# Patient Record
Sex: FEMALE | Race: WHITE | NOT HISPANIC OR LATINO | Employment: UNEMPLOYED | URBAN - METROPOLITAN AREA
[De-identification: names, ages, dates, MRNs, and addresses within clinical notes are randomized per-mention and may not be internally consistent; named-entity substitution may affect disease eponyms.]

---

## 2019-05-30 ENCOUNTER — HOSPITAL ENCOUNTER (OUTPATIENT)
Facility: HOSPITAL | Age: 49
Setting detail: OBSERVATION
Discharge: HOME | End: 2019-06-01
Attending: HOSPITALIST | Admitting: HOSPITALIST

## 2019-05-30 PROCEDURE — 96360 HYDRATION IV INFUSION INIT: CPT

## 2019-05-30 PROCEDURE — G0378 HOSPITAL OBSERVATION PER HR: HCPCS

## 2019-05-30 RX ORDER — RIZATRIPTAN BENZOATE 5 MG/1
5 TABLET, ORALLY DISINTEGRATING ORAL ONCE AS NEEDED
COMMUNITY

## 2019-05-30 RX ORDER — PANTOPRAZOLE SODIUM 40 MG/1
40 TABLET, DELAYED RELEASE ORAL
Status: DISCONTINUED | OUTPATIENT
Start: 2019-05-31 | End: 2019-05-31

## 2019-05-30 RX ORDER — OMEPRAZOLE 20 MG/1
20 CAPSULE, DELAYED RELEASE ORAL 2 TIMES DAILY
COMMUNITY

## 2019-05-30 RX ORDER — OXYCODONE AND ACETAMINOPHEN 7.5; 325 MG/1; MG/1
1 TABLET ORAL EVERY 6 HOURS SCHEDULED
COMMUNITY

## 2019-05-30 RX ORDER — SODIUM CHLORIDE 9 MG/ML
75 INJECTION, SOLUTION INTRAVENOUS CONTINUOUS
Status: DISCONTINUED | OUTPATIENT
Start: 2019-05-30 | End: 2019-06-01

## 2019-05-30 RX ORDER — ACYCLOVIR 400 MG/1
400 TABLET ORAL 2 TIMES DAILY
COMMUNITY

## 2019-05-30 RX ORDER — CEFTRIAXONE SODIUM 1 G/50ML
1 INJECTION, SOLUTION INTRAVENOUS EVERY 24 HOURS
Status: DISCONTINUED | OUTPATIENT
Start: 2019-05-30 | End: 2019-06-01

## 2019-05-30 RX ORDER — RANITIDINE HCL 75 MG
75 TABLET ORAL DAILY
COMMUNITY
End: 2019-06-01 | Stop reason: HOSPADM

## 2019-05-30 RX ORDER — PREGABALIN 50 MG/1
50 CAPSULE ORAL 3 TIMES DAILY
COMMUNITY

## 2019-05-30 RX ORDER — TOPIRAMATE 100 MG/1
200 TABLET, FILM COATED ORAL 2 TIMES DAILY
COMMUNITY

## 2019-05-30 RX ADMIN — SODIUM CHLORIDE 125 ML/HR: 9 INJECTION, SOLUTION INTRAVENOUS at 22:43

## 2019-05-31 ENCOUNTER — APPOINTMENT (OUTPATIENT)
Dept: ULTRASOUND IMAGING | Facility: HOSPITAL | Age: 49
End: 2019-05-31

## 2019-05-31 PROBLEM — R11.2 NAUSEA & VOMITING: Status: ACTIVE | Noted: 2019-05-31

## 2019-05-31 LAB
ALBUMIN SERPL-MCNC: 4.2 G/DL (ref 3.5–5.2)
ALBUMIN/GLOB SERPL: 1.7 G/DL
ALP SERPL-CCNC: 57 U/L (ref 39–117)
ALT SERPL W P-5'-P-CCNC: 7 U/L (ref 1–33)
ANION GAP SERPL CALCULATED.3IONS-SCNC: 11 MMOL/L
AST SERPL-CCNC: 12 U/L (ref 1–32)
BACTERIA UR QL AUTO: ABNORMAL /HPF
BASOPHILS # BLD AUTO: 0.01 10*3/MM3 (ref 0–0.2)
BASOPHILS NFR BLD AUTO: 0.1 % (ref 0–1.5)
BILIRUB SERPL-MCNC: 0.3 MG/DL (ref 0.2–1.2)
BILIRUB UR QL STRIP: NEGATIVE
BUN BLD-MCNC: 18 MG/DL (ref 6–20)
BUN/CREAT SERPL: 14.8 (ref 7–25)
CALCIUM SPEC-SCNC: 8.3 MG/DL (ref 8.6–10.5)
CHLORIDE SERPL-SCNC: 110 MMOL/L (ref 98–107)
CHLORIDE UR-SCNC: 40 MMOL/L
CLARITY UR: CLEAR
CO2 SERPL-SCNC: 16 MMOL/L (ref 22–29)
COLOR UR: YELLOW
CREAT BLD-MCNC: 1.22 MG/DL (ref 0.57–1)
CREAT UR-MCNC: 70.8 MG/DL
DEPRECATED RDW RBC AUTO: 50 FL (ref 37–54)
EOSINOPHIL # BLD AUTO: 0.15 10*3/MM3 (ref 0–0.4)
EOSINOPHIL NFR BLD AUTO: 1.7 % (ref 0.3–6.2)
ERYTHROCYTE [DISTWIDTH] IN BLOOD BY AUTOMATED COUNT: 12.9 % (ref 12.3–15.4)
GFR SERPL CREATININE-BSD FRML MDRD: 47 ML/MIN/1.73
GLOBULIN UR ELPH-MCNC: 2.5 GM/DL
GLUCOSE BLD-MCNC: 101 MG/DL (ref 65–99)
GLUCOSE UR STRIP-MCNC: NEGATIVE MG/DL
HCT VFR BLD AUTO: 42.6 % (ref 34–46.6)
HGB BLD-MCNC: 13.6 G/DL (ref 12–15.9)
HGB UR QL STRIP.AUTO: ABNORMAL
HYALINE CASTS UR QL AUTO: ABNORMAL /LPF
IMM GRANULOCYTES # BLD AUTO: 0.03 10*3/MM3 (ref 0–0.05)
IMM GRANULOCYTES NFR BLD AUTO: 0.3 % (ref 0–0.5)
KETONES UR QL STRIP: NEGATIVE
LEUKOCYTE ESTERASE UR QL STRIP.AUTO: ABNORMAL
LYMPHOCYTES # BLD AUTO: 1.52 10*3/MM3 (ref 0.7–3.1)
LYMPHOCYTES NFR BLD AUTO: 17.6 % (ref 19.6–45.3)
MAGNESIUM SERPL-MCNC: 1.7 MG/DL (ref 1.6–2.6)
MCH RBC QN AUTO: 33.4 PG (ref 26.6–33)
MCHC RBC AUTO-ENTMCNC: 31.9 G/DL (ref 31.5–35.7)
MCV RBC AUTO: 104.7 FL (ref 79–97)
MONOCYTES # BLD AUTO: 0.61 10*3/MM3 (ref 0.1–0.9)
MONOCYTES NFR BLD AUTO: 7.1 % (ref 5–12)
NEUTROPHILS # BLD AUTO: 6.33 10*3/MM3 (ref 1.7–7)
NEUTROPHILS NFR BLD AUTO: 73.2 % (ref 42.7–76)
NITRITE UR QL STRIP: NEGATIVE
NRBC BLD AUTO-RTO: 0 /100 WBC (ref 0–0.2)
PH UR STRIP.AUTO: 5.5 [PH] (ref 5–8)
PHOSPHATE SERPL-MCNC: 2.5 MG/DL (ref 2.5–4.5)
PLATELET # BLD AUTO: 218 10*3/MM3 (ref 140–450)
PMV BLD AUTO: 10.7 FL (ref 6–12)
POTASSIUM BLD-SCNC: 4.2 MMOL/L (ref 3.5–5.2)
PROT SERPL-MCNC: 6.7 G/DL (ref 6–8.5)
PROT UR QL STRIP: NEGATIVE
RBC # BLD AUTO: 4.07 10*6/MM3 (ref 3.77–5.28)
RBC # UR: ABNORMAL /HPF
REF LAB TEST METHOD: ABNORMAL
SODIUM BLD-SCNC: 137 MMOL/L (ref 136–145)
SODIUM UR-SCNC: <20 MMOL/L
SP GR UR STRIP: 1.01 (ref 1–1.03)
SQUAMOUS #/AREA URNS HPF: ABNORMAL /HPF
UROBILINOGEN UR QL STRIP: ABNORMAL
WBC NRBC COR # BLD: 8.65 10*3/MM3 (ref 3.4–10.8)
WBC UR QL AUTO: ABNORMAL /HPF

## 2019-05-31 PROCEDURE — 25010000002 CEFTRIAXONE PER 250 MG: Performed by: HOSPITALIST

## 2019-05-31 PROCEDURE — 96361 HYDRATE IV INFUSION ADD-ON: CPT

## 2019-05-31 PROCEDURE — 76775 US EXAM ABDO BACK WALL LIM: CPT

## 2019-05-31 PROCEDURE — 82570 ASSAY OF URINE CREATININE: CPT | Performed by: INTERNAL MEDICINE

## 2019-05-31 PROCEDURE — 83735 ASSAY OF MAGNESIUM: CPT | Performed by: INTERNAL MEDICINE

## 2019-05-31 PROCEDURE — 85025 COMPLETE CBC W/AUTO DIFF WBC: CPT | Performed by: INTERNAL MEDICINE

## 2019-05-31 PROCEDURE — G0378 HOSPITAL OBSERVATION PER HR: HCPCS

## 2019-05-31 PROCEDURE — 80053 COMPREHEN METABOLIC PANEL: CPT | Performed by: INTERNAL MEDICINE

## 2019-05-31 PROCEDURE — 81001 URINALYSIS AUTO W/SCOPE: CPT | Performed by: INTERNAL MEDICINE

## 2019-05-31 PROCEDURE — 84300 ASSAY OF URINE SODIUM: CPT | Performed by: INTERNAL MEDICINE

## 2019-05-31 PROCEDURE — 82436 ASSAY OF URINE CHLORIDE: CPT | Performed by: INTERNAL MEDICINE

## 2019-05-31 PROCEDURE — 84100 ASSAY OF PHOSPHORUS: CPT | Performed by: INTERNAL MEDICINE

## 2019-05-31 RX ORDER — PANTOPRAZOLE SODIUM 40 MG/1
40 TABLET, DELAYED RELEASE ORAL ONCE
Status: DISCONTINUED | OUTPATIENT
Start: 2019-05-31 | End: 2019-05-31

## 2019-05-31 RX ORDER — ACYCLOVIR 400 MG/1
400 TABLET ORAL 2 TIMES DAILY
Status: DISCONTINUED | OUTPATIENT
Start: 2019-05-31 | End: 2019-06-01 | Stop reason: HOSPADM

## 2019-05-31 RX ORDER — OXYCODONE AND ACETAMINOPHEN 7.5; 325 MG/1; MG/1
1 TABLET ORAL EVERY 6 HOURS SCHEDULED
Status: DISCONTINUED | OUTPATIENT
Start: 2019-05-31 | End: 2019-05-31

## 2019-05-31 RX ORDER — OXYCODONE AND ACETAMINOPHEN 7.5; 325 MG/1; MG/1
1 TABLET ORAL EVERY 4 HOURS PRN
Status: DISCONTINUED | OUTPATIENT
Start: 2019-05-31 | End: 2019-06-01

## 2019-05-31 RX ORDER — SUCRALFATE ORAL 1 G/10ML
1 SUSPENSION ORAL
Status: DISCONTINUED | OUTPATIENT
Start: 2019-05-31 | End: 2019-06-01 | Stop reason: HOSPADM

## 2019-05-31 RX ORDER — PREGABALIN 50 MG/1
50 CAPSULE ORAL 3 TIMES DAILY
Status: DISCONTINUED | OUTPATIENT
Start: 2019-05-31 | End: 2019-06-01 | Stop reason: HOSPADM

## 2019-05-31 RX ORDER — PANTOPRAZOLE SODIUM 40 MG/1
40 TABLET, DELAYED RELEASE ORAL ONCE
Status: COMPLETED | OUTPATIENT
Start: 2019-05-31 | End: 2019-05-31

## 2019-05-31 RX ORDER — TOPIRAMATE 100 MG/1
200 TABLET, FILM COATED ORAL DAILY
Status: DISCONTINUED | OUTPATIENT
Start: 2019-05-31 | End: 2019-06-01 | Stop reason: HOSPADM

## 2019-05-31 RX ORDER — PANTOPRAZOLE SODIUM 40 MG/1
40 TABLET, DELAYED RELEASE ORAL EVERY MORNING
Status: DISCONTINUED | OUTPATIENT
Start: 2019-06-01 | End: 2019-05-31

## 2019-05-31 RX ORDER — PANTOPRAZOLE SODIUM 40 MG/1
40 TABLET, DELAYED RELEASE ORAL
Status: DISCONTINUED | OUTPATIENT
Start: 2019-05-31 | End: 2019-06-01 | Stop reason: HOSPADM

## 2019-05-31 RX ADMIN — ACYCLOVIR 400 MG: 400 TABLET ORAL at 20:40

## 2019-05-31 RX ADMIN — PREGABALIN 50 MG: 50 CAPSULE ORAL at 20:39

## 2019-05-31 RX ADMIN — TOPIRAMATE 200 MG: 100 TABLET, FILM COATED ORAL at 15:22

## 2019-05-31 RX ADMIN — SUCRALFATE 1 G: 1 SUSPENSION ORAL at 20:39

## 2019-05-31 RX ADMIN — OXYCODONE HYDROCHLORIDE AND ACETAMINOPHEN 1 TABLET: 7.5; 325 TABLET ORAL at 20:39

## 2019-05-31 RX ADMIN — SUCRALFATE 1 G: 1 SUSPENSION ORAL at 16:39

## 2019-05-31 RX ADMIN — PANTOPRAZOLE SODIUM 40 MG: 40 TABLET, DELAYED RELEASE ORAL at 01:36

## 2019-05-31 RX ADMIN — PANTOPRAZOLE SODIUM 40 MG: 40 TABLET, DELAYED RELEASE ORAL at 16:39

## 2019-05-31 RX ADMIN — SODIUM CHLORIDE 75 ML/HR: 9 INJECTION, SOLUTION INTRAVENOUS at 15:22

## 2019-05-31 RX ADMIN — PREGABALIN 50 MG: 50 CAPSULE ORAL at 15:22

## 2019-05-31 RX ADMIN — CEFTRIAXONE SODIUM 1 G: 1 INJECTION, SOLUTION INTRAVENOUS at 18:18

## 2019-06-01 VITALS
HEART RATE: 68 BPM | SYSTOLIC BLOOD PRESSURE: 106 MMHG | WEIGHT: 117.4 LBS | TEMPERATURE: 98 F | BODY MASS INDEX: 22.18 KG/M2 | DIASTOLIC BLOOD PRESSURE: 63 MMHG | RESPIRATION RATE: 20 BRPM

## 2019-06-01 LAB
ALBUMIN SERPL-MCNC: 3.2 G/DL (ref 3.5–5.2)
ALBUMIN/GLOB SERPL: 1.4 G/DL
ALP SERPL-CCNC: 48 U/L (ref 39–117)
ALT SERPL W P-5'-P-CCNC: 6 U/L (ref 1–33)
ANION GAP SERPL CALCULATED.3IONS-SCNC: 8.5 MMOL/L
AST SERPL-CCNC: 11 U/L (ref 1–32)
BASOPHILS # BLD AUTO: 0.02 10*3/MM3 (ref 0–0.2)
BASOPHILS NFR BLD AUTO: 0.4 % (ref 0–1.5)
BILIRUB SERPL-MCNC: 0.2 MG/DL (ref 0.2–1.2)
BUN BLD-MCNC: 12 MG/DL (ref 6–20)
BUN/CREAT SERPL: 13.6 (ref 7–25)
CALCIUM SPEC-SCNC: 8.2 MG/DL (ref 8.6–10.5)
CHLORIDE SERPL-SCNC: 113 MMOL/L (ref 98–107)
CHOLEST SERPL-MCNC: 108 MG/DL (ref 0–200)
CO2 SERPL-SCNC: 18.5 MMOL/L (ref 22–29)
CREAT BLD-MCNC: 0.88 MG/DL (ref 0.57–1)
DEPRECATED RDW RBC AUTO: 50.1 FL (ref 37–54)
EOSINOPHIL # BLD AUTO: 0.19 10*3/MM3 (ref 0–0.4)
EOSINOPHIL NFR BLD AUTO: 3.5 % (ref 0.3–6.2)
ERYTHROCYTE [DISTWIDTH] IN BLOOD BY AUTOMATED COUNT: 13 % (ref 12.3–15.4)
GFR SERPL CREATININE-BSD FRML MDRD: 68 ML/MIN/1.73
GLOBULIN UR ELPH-MCNC: 2.3 GM/DL
GLUCOSE BLD-MCNC: 87 MG/DL (ref 65–99)
HBA1C MFR BLD: 5.04 % (ref 4.8–5.6)
HCT VFR BLD AUTO: 32 % (ref 34–46.6)
HDLC SERPL-MCNC: 40 MG/DL (ref 40–60)
HGB BLD-MCNC: 10.4 G/DL (ref 12–15.9)
LDLC SERPL CALC-MCNC: 55 MG/DL (ref 0–100)
LDLC/HDLC SERPL: 1.37 {RATIO}
LYMPHOCYTES # BLD AUTO: 2.5 10*3/MM3 (ref 0.7–3.1)
LYMPHOCYTES NFR BLD AUTO: 46.5 % (ref 19.6–45.3)
MCH RBC QN AUTO: 34.2 PG (ref 26.6–33)
MCHC RBC AUTO-ENTMCNC: 32.5 G/DL (ref 31.5–35.7)
MCV RBC AUTO: 105.3 FL (ref 79–97)
MONOCYTES # BLD AUTO: 0.47 10*3/MM3 (ref 0.1–0.9)
MONOCYTES NFR BLD AUTO: 8.7 % (ref 5–12)
NEUTROPHILS # BLD AUTO: 2.18 10*3/MM3 (ref 1.7–7)
NEUTROPHILS NFR BLD AUTO: 40.5 % (ref 42.7–76)
NT-PROBNP SERPL-MCNC: 36 PG/ML (ref 5–900)
PLATELET # BLD AUTO: 179 10*3/MM3 (ref 140–450)
PMV BLD AUTO: 11.3 FL (ref 6–12)
POTASSIUM BLD-SCNC: 4.1 MMOL/L (ref 3.5–5.2)
PROT SERPL-MCNC: 5.5 G/DL (ref 6–8.5)
RBC # BLD AUTO: 3.04 10*6/MM3 (ref 3.77–5.28)
SODIUM BLD-SCNC: 140 MMOL/L (ref 136–145)
TRIGL SERPL-MCNC: 66 MG/DL (ref 0–150)
TSH SERPL DL<=0.05 MIU/L-ACNC: 0.8 MIU/ML (ref 0.27–4.2)
VLDLC SERPL-MCNC: 13.2 MG/DL (ref 5–40)
WBC NRBC COR # BLD: 5.38 10*3/MM3 (ref 3.4–10.8)

## 2019-06-01 PROCEDURE — 84443 ASSAY THYROID STIM HORMONE: CPT | Performed by: HOSPITALIST

## 2019-06-01 PROCEDURE — G0378 HOSPITAL OBSERVATION PER HR: HCPCS

## 2019-06-01 PROCEDURE — 80053 COMPREHEN METABOLIC PANEL: CPT | Performed by: HOSPITALIST

## 2019-06-01 PROCEDURE — 85025 COMPLETE CBC W/AUTO DIFF WBC: CPT | Performed by: HOSPITALIST

## 2019-06-01 PROCEDURE — 80061 LIPID PANEL: CPT | Performed by: HOSPITALIST

## 2019-06-01 PROCEDURE — 83036 HEMOGLOBIN GLYCOSYLATED A1C: CPT | Performed by: HOSPITALIST

## 2019-06-01 PROCEDURE — 83880 ASSAY OF NATRIURETIC PEPTIDE: CPT | Performed by: HOSPITALIST

## 2019-06-01 RX ORDER — SODIUM BICARBONATE 650 MG/1
650 TABLET ORAL 3 TIMES DAILY
Status: DISCONTINUED | OUTPATIENT
Start: 2019-06-01 | End: 2019-06-01 | Stop reason: HOSPADM

## 2019-06-01 RX ORDER — SODIUM BICARBONATE 650 MG/1
650 TABLET ORAL 3 TIMES DAILY
Qty: 90 TABLET | Refills: 0 | Status: SHIPPED | OUTPATIENT
Start: 2019-06-01 | End: 2019-06-01 | Stop reason: HOSPADM

## 2019-06-01 RX ORDER — CEFUROXIME AXETIL 250 MG/1
250 TABLET ORAL EVERY 12 HOURS SCHEDULED
Status: DISCONTINUED | OUTPATIENT
Start: 2019-06-01 | End: 2019-06-01 | Stop reason: HOSPADM

## 2019-06-01 RX ORDER — CEFUROXIME AXETIL 250 MG/1
250 TABLET ORAL EVERY 12 HOURS SCHEDULED
Qty: 10 TABLET | Refills: 0 | Status: SHIPPED | OUTPATIENT
Start: 2019-06-01 | End: 2019-06-06

## 2019-06-01 RX ORDER — SUCRALFATE ORAL 1 G/10ML
1 SUSPENSION ORAL
Qty: 1200 ML | Refills: 0 | Status: SHIPPED | OUTPATIENT
Start: 2019-06-01 | End: 2019-07-01

## 2019-06-01 RX ADMIN — TOPIRAMATE 200 MG: 100 TABLET, FILM COATED ORAL at 09:19

## 2019-06-01 RX ADMIN — PANTOPRAZOLE SODIUM 40 MG: 40 TABLET, DELAYED RELEASE ORAL at 09:18

## 2019-06-01 RX ADMIN — OXYCODONE HYDROCHLORIDE AND ACETAMINOPHEN 1 TABLET: 7.5; 325 TABLET ORAL at 00:35

## 2019-06-01 RX ADMIN — OXYCODONE HYDROCHLORIDE AND ACETAMINOPHEN 1 TABLET: 7.5; 325 TABLET ORAL at 05:58

## 2019-06-01 RX ADMIN — SUCRALFATE 1 G: 1 SUSPENSION ORAL at 09:18

## 2019-06-01 RX ADMIN — ACYCLOVIR 400 MG: 400 TABLET ORAL at 09:18

## 2019-06-01 RX ADMIN — PREGABALIN 50 MG: 50 CAPSULE ORAL at 09:18

## 2019-06-01 NOTE — PROGRESS NOTES
Case Management Discharge Note    Final Note: pt dc'd to home today.    Destination      No service has been selected for the patient.      Durable Medical Equipment      No service has been selected for the patient.      Dialysis/Infusion      No service has been selected for the patient.      Home Medical Care      No service has been selected for the patient.      Therapy      No service has been selected for the patient.      Community Resources      No service has been selected for the patient.        Transportation Services  Private: Car    Final Discharge Disposition Code: 01 - home or self-care

## 2019-06-01 NOTE — PLAN OF CARE
Problem: Patient Care Overview  Goal: Individualization and Mutuality  Outcome: Ongoing (interventions implemented as appropriate)    Goal: Discharge Needs Assessment  Outcome: Ongoing (interventions implemented as appropriate)      Problem: Pain, Chronic (Adult)  Goal: Acceptable Pain/Comfort Level and Functional Ability  Outcome: Ongoing (interventions implemented as appropriate)      Problem: Renal Failure/Kidney Injury, Acute (Adult)  Goal: Signs and Symptoms of Listed Potential Problems Will be Absent, Minimized or Managed (Renal Failure/Kidney Injury, Acute)  Outcome: Ongoing (interventions implemented as appropriate)

## 2019-06-01 NOTE — PROGRESS NOTES
Daily progress note    Chief complaint  Doing much better  No new complaints  Wants to go home    History of present illness  49-year-old white female with history of hypertension gastroesophageal for disease and chronic low back pain presented to [facility] with abdominal pain diarrhea fatigue tired and generalized weakness for last 3 days.  Patient evaluated in ER found to have acute UTI with acute kidney injury admit for management.  Patient denies any burning in the urination.  Patient denies any fever chills.  Patient also denies any chest pain shortness of breath but she has no energy.  Patient admitted for management.    Review of Systems  Pertinent items are noted in HPI    Physical exam  Blood pressure 106/63, pulse 68, temperature 98 °F (36.7 °C), temperature source Oral, resp. rate 20, weight 53.3 kg (117 lb 6.4 oz).    HEENT unremarkable  Neck supple  Lungs decreased breath sounds otherwise clear no wheezes rhonchi crackles  Heart S1-S2 no murmur gallop rub  Abdomen soft mild tenderness hyperactive bowel sounds no rebound tenderness no masses palpable  Extremities no edema  Neuro no focal deficit  Psych normal mood and behavior    LABS  Lab Results (last 24 hours)     Procedure Component Value Units Date/Time    BNP [[id number]]  (Normal) Collected:  06/01/19 0321    Specimen:  Blood Updated:  06/01/19 0501     proBNP 36.0 pg/mL     Narrative:       Among patients with dyspnea, NT-proBNP is highly sensitive for the detection of acute congestive heart failure. In addition NT-proBNP of <300 pg/ml effectively rules out acute congestive heart failure with 99% negative predictive value.    TSH [[id number]]  (Normal) Collected:  06/01/19 0321    Specimen:  Blood Updated:  06/01/19 0501     TSH 0.801 mIU/mL     Comprehensive Metabolic Panel [[id number]]  (Abnormal) Collected:  06/01/19 0321    Specimen:  Blood Updated:  06/01/19 0448     Glucose 87 mg/dL      BUN 12 mg/dL      Creatinine 0.88 mg/dL       Sodium 140 mmol/L      Potassium 4.1 mmol/L      Chloride 113 mmol/L      CO2 18.5 mmol/L      Calcium 8.2 mg/dL      Total Protein 5.5 g/dL      Albumin 3.20 g/dL      ALT (SGPT) 6 U/L      AST (SGOT) 11 U/L      Alkaline Phosphatase 48 U/L      Total Bilirubin 0.2 mg/dL      eGFR Non African Amer 68 mL/min/1.73      Globulin 2.3 gm/dL      A/G Ratio 1.4 g/dL      BUN/Creatinine Ratio 13.6     Anion Gap 8.5 mmol/L     Narrative:       GFR Normal >60  Chronic Kidney Disease <60  Kidney Failure <15    Lipid Panel [[id number]] Collected:  06/01/19 0321    Specimen:  Blood Updated:  06/01/19 0448     Total Cholesterol 108 mg/dL      Triglycerides 66 mg/dL      HDL Cholesterol 40 mg/dL      LDL Cholesterol  55 mg/dL      VLDL Cholesterol 13.2 mg/dL      LDL/HDL Ratio 1.37    Narrative:       Cholesterol Reference Ranges  (U.S. Department of Health and Human Services ATP III Classifications)    Desirable          <200 mg/dL  Borderline High    200-239 mg/dL  High Risk          >240 mg/dL      Triglyceride Reference Ranges  (U.S. Department of Health and Human Services ATP III Classifications)    Normal           <150 mg/dL  Borderline High  150-199 mg/dL  High             200-499 mg/dL  Very High        >500 mg/dL    HDL Reference Ranges  (U.S. Department of Health and Human Services ATP III Classifcations)    Low     <40 mg/dl (major risk factor for CHD)  High    >60 mg/dl ('negative' risk factor for CHD)        LDL Reference Ranges  (U.S. Department of Health and Human Services ATP III Classifcations)    Optimal          <100 mg/dL  Near Optimal     100-129 mg/dL  Borderline High  130-159 mg/dL  High             160-189 mg/dL  Very High        >189 mg/dL    CBC & Differential [[id number]] Collected:  06/01/19 0321    Specimen:  Blood Updated:  06/01/19 0432    Narrative:       The following orders were created for panel order CBC & Differential.  Procedure                               Abnormality         Status                      ---------                               -----------         ------                     CBC Auto Differential[[id number]]        Abnormal            Final result                 Please view results for these tests on the individual orders.    CBC Auto Differential [[id number]]  (Abnormal) Collected:  06/01/19 0321    Specimen:  Blood Updated:  06/01/19 0432     WBC 5.38 10*3/mm3      RBC 3.04 10*6/mm3      Hemoglobin 10.4 g/dL      Hematocrit 32.0 %      .3 fL      MCH 34.2 pg      MCHC 32.5 g/dL      RDW 13.0 %      RDW-SD 50.1 fl      MPV 11.3 fL      Platelets 179 10*3/mm3      Neutrophil % 40.5 %      Lymphocyte % 46.5 %      Monocyte % 8.7 %      Eosinophil % 3.5 %      Basophil % 0.4 %      Neutrophils, Absolute 2.18 10*3/mm3      Lymphocytes, Absolute 2.50 10*3/mm3      Monocytes, Absolute 0.47 10*3/mm3      Eosinophils, Absolute 0.19 10*3/mm3      Basophils, Absolute 0.02 10*3/mm3     Hemoglobin A1c [[id number]]  (Normal) Collected:  06/01/19 0321    Specimen:  Blood Updated:  06/01/19 0430     Hemoglobin A1C 5.04 %     Narrative:       Hemoglobin A1C Ranges:    Increased Risk for Diabetes  5.7% to 6.4%  Diabetes                     >= 6.5%  Diabetic Goal                < 7.0%        Imaging Results (last 24 hours)     Procedure Component Value Units Date/Time    US Renal Bilateral [[id number]] Collected:  05/31/19 1329     Updated:  05/31/19 1432    Narrative:       US RENAL BILATERAL-     CLINICAL: Acute renal insufficiency.     COMPARISON: CT scan of the abdomen 07/15/2010.     FINDINGS: The right kidney is 10.3 cm in length, the left kidney is 10  cm in length.     No obstructive uropathy. The renal cortical echotexture is normal. There  is a 3 mm capsular or subcapsular punctate calcification along the upper  pole of the right kidney. No right or left-sided renal calculi or mass.     CONCLUSION: The kidneys are normal with the exception of a tiny  capsular/subcapsular calcification along  the upper pole of the right  kidney.     This report was finalized on 5/31/2019 2:29 PM by Dr. Byron Burnett M.D.             Current Facility-Administered Medications:   •  acyclovir (ZOVIRAX) tablet 400 mg, 400 mg, Oral, BID, Main Muller MD, 400 mg at 06/01/19 0918  •  cefTRIAXone (ROCEPHIN) IVPB 1 g, 1 g, Intravenous, Q24H, Main Muller MD, Last Rate: 100 mL/hr at 05/31/19 1818, 1 g at 05/31/19 1818  •  oxyCODONE-acetaminophen (PERCOCET) 7.5-325 MG per tablet 1 tablet, 1 tablet, Oral, Q4H PRN, Main Muller MD, 1 tablet at 06/01/19 0558  •  pantoprazole (PROTONIX) EC tablet 40 mg, 40 mg, Oral, BID AC, Main Muller MD, 40 mg at 06/01/19 0918  •  pregabalin (LYRICA) capsule 50 mg, 50 mg, Oral, TID, Main Muller MD, 50 mg at 06/01/19 0918  •  sucralfate (CARAFATE) 1 GM/10ML suspension 1 g, 1 g, Oral, 4x Daily AC & at Bedtime, Main Muller MD, 1 g at 06/01/19 0918  •  topiramate (TOPAMAX) tablet 200 mg, 200 mg, Oral, Daily, Main Muller MD, 200 mg at 06/01/19 0919     ASSESSMENT  Acute UTI  Acute kidney injury resolved  Metabolic acidosis  Chronic low back pain  Migraine headache  Gastroesophageal reflux disease    PLAN  Discharge home  Discharge summary dictated    MAIN MULLER MD

## 2019-06-01 NOTE — PROGRESS NOTES
LOS: 1 day    Patient Care Team:  Elliot Louis MD as PCP - General (Family Medicine)    Chief Complaint:  SOMMER    Subjective   Chart reviewed.  Feels well.  No CP or SOA.  No other complaints.    Objective     Vital Sign Min/Max for last 24 hours  Temp  Min: 98 °F (36.7 °C)  Max: 98.3 °F (36.8 °C)   BP  Min: 88/59  Max: 106/63   Pulse  Min: 68  Max: 75   Resp  Min: 18  Max: 20   No Data Recorded   No Data Recorded   Weight  Min: 53.3 kg (117 lb 6.4 oz)  Max: 53.3 kg (117 lb 6.4 oz)     Flowsheet Rows      First Filed Value   Admission Height  --   Admission Weight  51.1 kg (112 lb 9.6 oz) Documented at 05/30/2019 2117          I/O this shift:  In: 240 [P.O.:240]  Out: -   I/O last 3 completed shifts:  In: -   Out: 550 [Urine:550]    Physical Exam:  GEN: Awake, NAD  ENT: PERRL, EOMI, MMM  NECK: Supple, no JVD  CHEST: CTAB, no W/R/C  CV: RRR, no M/G/R  ABD: Soft, NT, +BS  SKIN: Warm and Dry  NEURO: CN's intact      WBC WBC   Date Value Ref Range Status   06/01/2019 5.38 3.40 - 10.80 10*3/mm3 Final   05/31/2019 8.65 3.40 - 10.80 10*3/mm3 Final      HGB Hemoglobin   Date Value Ref Range Status   06/01/2019 10.4 (L) 12.0 - 15.9 g/dL Final   05/31/2019 13.6 12.0 - 15.9 g/dL Final      HCT Hematocrit   Date Value Ref Range Status   06/01/2019 32.0 (L) 34.0 - 46.6 % Final   05/31/2019 42.6 34.0 - 46.6 % Final      Platlets No results found for: LABPLAT   MCV MCV   Date Value Ref Range Status   06/01/2019 105.3 (H) 79.0 - 97.0 fL Final   05/31/2019 104.7 (H) 79.0 - 97.0 fL Final          Sodium Sodium   Date Value Ref Range Status   06/01/2019 140 136 - 145 mmol/L Final   05/31/2019 137 136 - 145 mmol/L Final      Potassium Potassium   Date Value Ref Range Status   06/01/2019 4.1 3.5 - 5.2 mmol/L Final   05/31/2019 4.2 3.5 - 5.2 mmol/L Final      Chloride Chloride   Date Value Ref Range Status   06/01/2019 113 (H) 98 - 107 mmol/L Final   05/31/2019 110 (H) 98 - 107 mmol/L Final      CO2 CO2   Date Value Ref Range  Status   06/01/2019 18.5 (L) 22.0 - 29.0 mmol/L Final   05/31/2019 16.0 (L) 22.0 - 29.0 mmol/L Final      BUN BUN   Date Value Ref Range Status   06/01/2019 12 6 - 20 mg/dL Final   05/31/2019 18 6 - 20 mg/dL Final      Creatinine Creatinine   Date Value Ref Range Status   06/01/2019 0.88 0.57 - 1.00 mg/dL Final   05/31/2019 1.22 (H) 0.57 - 1.00 mg/dL Final      Calcium Calcium   Date Value Ref Range Status   06/01/2019 8.2 (L) 8.6 - 10.5 mg/dL Final   05/31/2019 8.3 (L) 8.6 - 10.5 mg/dL Final      PO4 No results found for: CAPO4   Albumin Albumin   Date Value Ref Range Status   06/01/2019 3.20 (L) 3.50 - 5.20 g/dL Final   05/31/2019 4.20 3.50 - 5.20 g/dL Final      Magnesium Magnesium   Date Value Ref Range Status   05/31/2019 1.7 1.6 - 2.6 mg/dL Final      Uric Acid No results found for: URICACID        Results Review:     I reviewed the patient's new clinical results.      acyclovir 400 mg Oral BID   ceftriaxone 1 g Intravenous Q24H   pantoprazole 40 mg Oral BID AC   pregabalin 50 mg Oral TID   sucralfate 1 g Oral 4x Daily AC & at Bedtime   topiramate 200 mg Oral Daily          Medication Review: Reviewed    Assessment/Plan     1) SOMMER - pre-renal from diarrhea and poor po intake  2) Metabolic acidosis  3) Hyponatremia - hypovolemic  4) UTI  5) Diarrhea    PLAN: Cr improved and stable at 0.8.  Lytes and volume OK.  Bicarb improving with improved renal function.  OK for D/C anytime from a renal standpoint.        Abad Ferreira MD   [facility]  06/01/19  1:44 PM

## 2019-06-01 NOTE — DISCHARGE SUMMARY
Discharge summary    Date of admission 5/30/2019  Date of discharge 6/1/2019    Final diagnosis  Acute UTI  Acute kidney injury resolved  Metabolic acidosis  Chronic low back pain  Migraine headache  Gastroesophageal reflux disease    Discharge medications    Current Facility-Administered Medications:   •  acyclovir (ZOVIRAX) tablet 400 mg, 400 mg, Oral, BID, Main Layne MD, 400 mg at 06/01/19 0918  •  cefuroxime (CEFTIN) tablet 250 mg, 250 mg, Oral, Q12H, Main Layne MD  •  pantoprazole (PROTONIX) EC tablet 40 mg, 40 mg, Oral, BID AC, Main Layne MD, 40 mg at 06/01/19 0918  •  pregabalin (LYRICA) capsule 50 mg, 50 mg, Oral, TID, Main Layne MD, 50 mg at 06/01/19 0918  •  sodium bicarbonate tablet 650 mg, 650 mg, Oral, TID, Main Layne MD  •  sucralfate (CARAFATE) 1 GM/10ML suspension 1 g, 1 g, Oral, 4x Daily AC & at Bedtime, Main Layne MD, 1 g at 06/01/19 0918  •  topiramate (TOPAMAX) tablet 200 mg, 200 mg, Oral, Daily, Main Layne MD, 200 mg at 06/01/19 0919    Consult obtained  Nephrology    Procedures  None    Hospital course  49-year-old white female with history of hypertension gastroesophageal reflux disease and chronic low back pain admit to emergency [facility] with abdominal pain nausea vomiting diarrhea and work-up revealed acute kidney injury and UTI admitted to [facility].  Patient admitted treated with fluids and antibiotics and her symptoms completely resolved and kidney function returned to normal.  Patient followed by nephrology and did check her renal ultrasound which is essentially unremarkable.  Patient does have metabolic acidosis and is improving and nephrology recommend no further work-up is because of kidney injury and they will follow as an outpatient.  Patient discharged home on oral antibiotics for 5 more days.    Discharge diet regular    Activity as tolerated    Medication as above    Follow-up with primary doctor in 1 week and follow with  nephrology per the instructions and take medication as directed    MICHELE MULLER MD

## 2023-01-04 ENCOUNTER — OFFICE VISIT (OUTPATIENT)
Dept: OBSTETRICS AND GYNECOLOGY | Facility: CLINIC | Age: 53
End: 2023-01-04
Payer: MEDICAID

## 2023-01-04 VITALS
BODY MASS INDEX: 20.09 KG/M2 | HEIGHT: 61 IN | DIASTOLIC BLOOD PRESSURE: 70 MMHG | SYSTOLIC BLOOD PRESSURE: 122 MMHG | WEIGHT: 106.4 LBS

## 2023-01-04 DIAGNOSIS — N94.10 FEMALE DYSPAREUNIA: ICD-10-CM

## 2023-01-04 DIAGNOSIS — N95.2 ATROPHIC VAGINITIS: ICD-10-CM

## 2023-01-04 DIAGNOSIS — N89.8 VAGINAL DISCHARGE: ICD-10-CM

## 2023-01-04 DIAGNOSIS — Z01.411 ENCOUNTER FOR GYNECOLOGICAL EXAMINATION WITH ABNORMAL FINDING: Primary | ICD-10-CM

## 2023-01-04 DIAGNOSIS — Z12.31 ENCOUNTER FOR SCREENING MAMMOGRAM FOR MALIGNANT NEOPLASM OF BREAST: ICD-10-CM

## 2023-01-04 PROCEDURE — 3008F BODY MASS INDEX DOCD: CPT | Performed by: OBSTETRICS & GYNECOLOGY

## 2023-01-04 PROCEDURE — 1159F MED LIST DOCD IN RCRD: CPT | Performed by: OBSTETRICS & GYNECOLOGY

## 2023-01-04 PROCEDURE — 2014F MENTAL STATUS ASSESS: CPT | Performed by: OBSTETRICS & GYNECOLOGY

## 2023-01-04 PROCEDURE — 99213 OFFICE O/P EST LOW 20 MIN: CPT | Performed by: OBSTETRICS & GYNECOLOGY

## 2023-01-04 PROCEDURE — 99386 PREV VISIT NEW AGE 40-64: CPT | Performed by: OBSTETRICS & GYNECOLOGY

## 2023-01-04 PROCEDURE — 1160F RVW MEDS BY RX/DR IN RCRD: CPT | Performed by: OBSTETRICS & GYNECOLOGY

## 2023-01-04 RX ORDER — TOPIRAMATE 100 MG/1
2 TABLET, FILM COATED ORAL EVERY MORNING
COMMUNITY
Start: 2022-10-18

## 2023-01-04 RX ORDER — RIZATRIPTAN BENZOATE 10 MG/1
10 TABLET, ORALLY DISINTEGRATING ORAL
COMMUNITY
Start: 2022-08-28

## 2023-01-04 RX ORDER — CONJUGATED ESTROGENS 0.62 MG/G
CREAM VAGINAL 3 TIMES WEEKLY
Qty: 30 G | Refills: 3 | Status: SHIPPED | OUTPATIENT
Start: 2023-01-04

## 2023-01-04 RX ORDER — FAMOTIDINE 20 MG/1
20 TABLET, FILM COATED ORAL
COMMUNITY

## 2023-01-04 RX ORDER — CYANOCOBALAMIN 1000 UG/ML
INJECTION, SOLUTION INTRAMUSCULAR; SUBCUTANEOUS
COMMUNITY
Start: 2022-09-29

## 2023-01-04 RX ORDER — DEXLANSOPRAZOLE 60 MG/1
CAPSULE, DELAYED RELEASE ORAL
COMMUNITY
Start: 2022-08-20

## 2023-01-04 NOTE — PROGRESS NOTES
Chief Complaint  Gynecologic Exam (Pt here for vaginal dryness and an odor. Had a hysterectomy @24 and hasn't had a pap in a very long time. Last mammo a long time ago as well.)    Subjective        Sushma White presents to [facility]  History of Present Illness  Patient is here for annual exam.  She reports she has not had annual gynecologic exam in a long time.  She had a hysterectomy at age 24.  6 months later she had bilateral salpingo-oophorectomy.  She did not ever go on hormone replacement therapy after her surgeries.  Last mammogram was unknown but was several years ago.  She does report DEXA scan within the last 2 years.  Colonoscopy is up-to-date.  Patient is also here for evaluation of concerns with vaginal dryness, irritation, and pain.  She reports that she had not been sexually active for several years; however, within the last few months she has a new partner and they tried to have sexual intercourse.  She reports that this has been painful for her.  She also reports presence of a discharge and some odor since becoming sexually active again.  She denies vaginal bleeding.    Menstrual History:  OB History    No obstetric history on file.        No LMP recorded. Patient has had a hysterectomy.     Past Medical History:   Diagnosis Date   • Arthritis     generalized   • GERD (gastroesophageal reflux disease)        Past Surgical History:   Procedure Laterality Date   • ABDOMINAL SURGERY     • APPENDECTOMY     • BACK SURGERY     • COLON SURGERY     • COLONOSCOPY     • COSMETIC SURGERY      2 tummy tucks   • ENDOSCOPY     • HYSTERECTOMY     • HYSTEROSCOPY     • SKIN BIOPSY     • TONSILLECTOMY         Social History     Tobacco Use   • Smoking status: Every Day     Packs/day: 0.50     Types: Cigarettes     Start date: 8/30/1984   • Smokeless tobacco: Never   Vaping Use   • Vaping Use: Never used   Substance Use Topics   • Alcohol use: Not Currently     Comment: once a month   • Drug  use: Yes     Types: Marijuana       Family History   Problem Relation Age of Onset   • Arthritis Mother    • Cancer Mother    • Diabetes Mother    • Hyperlipidemia Mother    • Hypertension Mother    • Cancer Maternal Uncle    • Arthritis Maternal Grandmother    • Cancer Maternal Grandmother    • Diabetes Maternal Grandfather    • Kidney disease Maternal Grandfather      Current Outpatient Medications on File Prior to Visit   Medication Sig   • acyclovir (ZOVIRAX) 400 MG tablet Take 400 mg by mouth 2 (Two) Times a Day. Take no more than 5 doses a day.   • Calcium Carbonate-Vitamin D (Oscal 500 D-3) 500-5 MG-MCG tablet Take 1 tablet by mouth Daily.   • cyanocobalamin 1000 MCG/ML injection Inject 1ml IM once a week for 4 weeks and then once a month after that.   • dexlansoprazole (DEXILANT) 60 MG capsule    • famotidine (PEPCID) 20 MG tablet Take 20 mg by mouth.   • omeprazole (priLOSEC) 20 MG capsule Take 20 mg by mouth 2 (Two) Times a Day.   • rizatriptan MLT (MAXALT-MLT) 10 MG disintegrating tablet Take 10 mg by mouth.   • topiramate (TOPAMAX) 100 MG tablet Take 200 mg by mouth 2 (Two) Times a Day.   • oxyCODONE-acetaminophen (PERCOCET) 7.5-325 MG per tablet Take 1 tablet by mouth Every 6 (Six) Hours.   • pregabalin (LYRICA) 50 MG capsule Take 50 mg by mouth 3 (Three) Times a Day.   • rizatriptan MLT (MAXALT-MLT) 5 MG disintegrating tablet Take 5 mg by mouth 1 (One) Time As Needed for Migraine. May repeat in 2 hours if needed   • topiramate (TOPAMAX) 100 MG tablet Take 2 tablets by mouth Every Morning.     No current facility-administered medications on file prior to visit.       Allergies   Allergen Reactions   • Latex Other (See Comments)     blisters     ROS:  Constitutional: No fevers, chills, sweats   Eye: No recent visual problems, denies blurry vision   HEENT: No ear pain, nasal congestion, sore throat, voice changes   Respiratory: No shortness of breath, cough, pain on breathing   Cardiovascular: No Chest  pain, palpitations   Gastrointestinal: No nausea, vomiting, diarrhea, constipation   Genitourinary: No hematuria, dysuria, lesions on genitalia   Hema/Lymph: Negative for bruising, no edema   Endocrine: Negative for excessive thirst, excessive hunger, heat or cold intolerance   Musculoskeletal: No joint pain, muscle pain, decreased range of motion   Integumentary: No rash, pruritus, abrasions, lesions   Neurologic: No weakness, numbness, headaches   Psychiatric: No anxiety, depression, mood changes       Objective   Vital Signs:  /70   Ht 154.9 cm (61\")   Wt 48.3 kg (106 lb 6.4 oz)   BMI 20.10 kg/m²   Estimated body mass index is 20.1 kg/m² as calculated from the following:    Height as of this encounter: 154.9 cm (61\").    Weight as of this encounter: 48.3 kg (106 lb 6.4 oz).    BMI is within normal parameters. No other follow-up for BMI required.      Physical Exam     Exam performed in the presence of a female chaperone  Patient has provided verbal consent to proceed with exam.    Gen: No acute distress, awake and oriented times three  HENT: Normocephalic, atraumatic, Moist mucous membranes  Eyes: PERRLA, EOMI  Lungs: Normal work of breathing, lungs clear bilaterally, no crackles/wheezes  Breast: Symmetrical. No skin changes or nipple retractions. No lumps or masses bilaterally. No tenderness bilaterally.  Abdomen: soft, nontender, no masses or hernia, no hepatosplenomegaly, non distended, normoactive bowel sounds  Normal external female genitalia, no lesions  Urethra: Normal meatus, no caruncle  Bladder: nontender  Vagina: No blood or discharge; atrophic changes noted; very small/narrow genital hiatus  Cervix: Surgically absent  Uterus: Surgically absent  Adnexa: No masses or tenderness  External anal exam: Normal appearance, no lesions or hemorrhoids  Rectal: Deferred  Skin: Warm and dry, no rashes   Psych: Good judgement and insight, normal affect and mood  Neuro: CN 2-12 intact, no gross  deficits      Result Review :  The following data was reviewed by: Blayne Grant MD on 01/04/2023:  Common labs    Common Labs 1/31/22 8/1/22   Calcium 9.0 9.5            Reviewed pior hormone lab results (FSH/Estradiol - 2019)    Data reviewed: Reviewed notes from prior GYN, Dr. Hathaway          Assessment and Plan   Diagnoses and all orders for this visit:    1. Encounter for gynecological examination with abnormal finding (Primary)  Pap - Not indicated.  Patient with history of prior hysterectomy and no significant history of cervical dysplasia  STD screening -sexually active with a new partner.  Cultures ordered today.  Contraception - menopausal/hyst  Breast cancer screening. Patient encouraged to perform routine self breast exams. Mammogram ordered.  Recommend yearly clinical breast exam.  Recommend pt take calcium and vitamin D supplementation.  Patient with history of surgical menopause at age 25.  Patient should have routine DEXA screens at this time.  She reports that her last DEXA was within the last 2 years and was normal.    Patient up-to-date on colonoscopy/colon cancer screening.  Encourage aerobic exercise with at least 30 minutes 5 days/wk.  Avoid excessive alcohol use.    Patient is advised to call the office for results after 1 week if she has not seen or heard the results of any tests ordered or performed today.  2. Atrophic vaginitis  -     Estrogens Conjugated (Premarin) 0.625 MG/GM vaginal cream; Insert  into the vagina 3 (Three) Times a Week.  Dispense: 30 g; Refill: 3  Patient with vaginal dryness and painful intercourse, which is most certainly related to atrophic vaginitis.  Patient went through surgical menopause at around age 25 and never was on hormone replacement therapy.  We discussed the use of intravaginal topical estrogen cream.  The risk, benefits, terms, and side effects were discussed.  I feel this will help her significantly both with her vaginal dryness,  dyspareunia, and prevention of UTI and other vaginal infections.  Cultures were obtained today as well.  We will treat as indicated based on culture results.  Gave instructions for the initiation of use of Premarin cream once nightly for 2 weeks, then decreasing usage to about 3 times weekly.  I would like to see her back in about 2 months to see how this is going.    3. Vaginal discharge  -     NuSwab VG+ - Swab, Vagina    4. Female dyspareunia  -     Estrogens Conjugated (Premarin) 0.625 MG/GM vaginal cream; Insert  into the vagina 3 (Three) Times a Week.  Dispense: 30 g; Refill: 3  As above, likely at least partially related atrophic vaginitis.  Additionally, patient with a very narrow genital hiatus which may also be contributing to her discomfort.  If patient is only partially relieved with the vaginal estradiol, may consider pelvic or physical therapy.  She has done pelvic floor physical therapy in the past, but specifically she may benefit from use of dilators with pelvic floor PT.           Follow Up   Return in about 2 months (around 3/4/2023) for Recheck.  Patient was given instructions and counseling regarding her condition or for health maintenance advice. Please see specific information pulled into the AVS if appropriate.

## 2023-01-06 ENCOUNTER — TELEPHONE (OUTPATIENT)
Dept: OBSTETRICS AND GYNECOLOGY | Facility: CLINIC | Age: 53
End: 2023-01-06
Payer: MEDICARE

## 2023-01-06 LAB
A VAGINAE DNA VAG QL NAA+PROBE: ABNORMAL SCORE
BVAB2 DNA VAG QL NAA+PROBE: ABNORMAL SCORE
C ALBICANS DNA VAG QL NAA+PROBE: NEGATIVE
C GLABRATA DNA VAG QL NAA+PROBE: NEGATIVE
C TRACH DNA VAG QL NAA+PROBE: NEGATIVE
MEGA1 DNA VAG QL NAA+PROBE: ABNORMAL SCORE
N GONORRHOEA DNA VAG QL NAA+PROBE: NEGATIVE
T VAGINALIS DNA VAG QL NAA+PROBE: NEGATIVE

## 2023-01-06 RX ORDER — METRONIDAZOLE 500 MG/1
500 TABLET ORAL 2 TIMES DAILY
Qty: 14 TABLET | Refills: 0 | Status: SHIPPED | OUTPATIENT
Start: 2023-01-06 | End: 2023-01-13

## 2023-01-06 NOTE — TELEPHONE ENCOUNTER
Message left for patient to call office. Patient needs to know vaginal culture positive and that Dr. Grant has sent medication to pharmacy.

## 2023-01-09 ENCOUNTER — TELEPHONE (OUTPATIENT)
Dept: OBSTETRICS AND GYNECOLOGY | Facility: CLINIC | Age: 53
End: 2023-01-09
Payer: MEDICARE

## 2023-03-08 ENCOUNTER — OFFICE VISIT (OUTPATIENT)
Dept: OBSTETRICS AND GYNECOLOGY | Facility: CLINIC | Age: 53
End: 2023-03-08
Payer: MEDICARE

## 2023-03-08 VITALS
BODY MASS INDEX: 21.01 KG/M2 | WEIGHT: 111.3 LBS | SYSTOLIC BLOOD PRESSURE: 120 MMHG | HEIGHT: 61 IN | DIASTOLIC BLOOD PRESSURE: 70 MMHG

## 2023-03-08 DIAGNOSIS — N95.2 ATROPHIC VAGINITIS: Primary | ICD-10-CM

## 2023-03-08 DIAGNOSIS — N94.10 FEMALE DYSPAREUNIA: ICD-10-CM

## 2023-03-08 PROCEDURE — 1160F RVW MEDS BY RX/DR IN RCRD: CPT | Performed by: OBSTETRICS & GYNECOLOGY

## 2023-03-08 PROCEDURE — 99212 OFFICE O/P EST SF 10 MIN: CPT | Performed by: OBSTETRICS & GYNECOLOGY

## 2023-03-08 PROCEDURE — 1159F MED LIST DOCD IN RCRD: CPT | Performed by: OBSTETRICS & GYNECOLOGY

## 2023-03-08 RX ORDER — CLOBETASOL PROPIONATE 0.5 MG/G
OINTMENT TOPICAL
COMMUNITY
Start: 2023-02-07

## 2023-03-08 RX ORDER — METHOCARBAMOL 750 MG/1
TABLET, FILM COATED ORAL
COMMUNITY
Start: 2023-02-20

## 2023-03-08 NOTE — PROGRESS NOTES
"Chief Complaint  Follow-up (Patient is here today for a follow up on vaginal dryness and odor. Still is experiencing some dryness, odor is gone.  )    Subjective        Sushma White presents to [facility]  History of Present Illness  Patient reports that the vaginal odor has resolved but she still having some dryness again.  She says that she initially used the estradiol cream daily for 2 weeks but has not used it since.  She has not tried to be sexually active since her last visit.    The following portions of the patient's history were reviewed and updated as appropriate: allergies, current medications, past family history, past medical history, past social history, past surgical history, and problem list.    Objective   Vital Signs:  Ht 154.9 cm (61\")   Wt 50.5 kg (111 lb 4.8 oz)   BMI 21.03 kg/m²   Estimated body mass index is 21.03 kg/m² as calculated from the following:    Height as of this encounter: 154.9 cm (61\").    Weight as of this encounter: 50.5 kg (111 lb 4.8 oz).       BMI is within normal parameters. No other follow-up for BMI required.      Physical Exam   General: No acute distress, awake and oriented x3  Psychiatric: good judgment and insight, normal mood  Neurological: cranial nerves II through XII intact, no deficits    Result Review :                   Assessment and Plan   Diagnoses and all orders for this visit:    1. Atrophic vaginitis (Primary)    2. Female dyspareunia    It seems patient was confused on how to use the estradiol vaginal cream.  We discussed that after induction therapy she would need maintenance therapy which is typically using the cream vaginally about 3 days/week.  We discussed self adjustment on dosing based on symptoms as well.  Since it has now been about 2 months again since she has used the cream, I would recommend repeating an induction phase with daily use for 2 weeks and then going to 3 times weekly dosing.  If the patient is noticing " improvement in her dryness with resumption of the estradiol cream she may opt to try sexual activity.  If this is improved with the cream, no further therapy may be needed other than to continue the cream.  If she is still having pain even after using estradiol cream, I would recommend pelvic floor physical therapy as a neck step.  Patient was prescribed the estradiol vaginal cream at the last visit with 3 refills.  She should be good with this for a year.  Patient can see me back as needed.  Otherwise I would see her in January for routine annual exam.             Follow Up   No follow-ups on file.  Patient was given instructions and counseling regarding her condition or for health maintenance advice. Please see specific information pulled into the AVS if appropriate.

## 2023-03-13 ENCOUNTER — TELEPHONE (OUTPATIENT)
Dept: OBSTETRICS AND GYNECOLOGY | Facility: CLINIC | Age: 53
End: 2023-03-13
Payer: MEDICARE

## 2023-03-13 NOTE — TELEPHONE ENCOUNTER
I sent in a prescription on 1/4/2023 to [facility] pharmacy in [place] with 3 refills.  This should be enough to last her for a year

## 2023-03-13 NOTE — TELEPHONE ENCOUNTER
"Epic shows that \"pharmacy received the prescription\".  I called the pharmacy and spoke with the pharmacist myself.  He says they did receive the prescription.  I do not know what the discrepancy is but I called today and he took the prescription verbally.  "

## 2023-03-13 NOTE — TELEPHONE ENCOUNTER
"Patient calling says pharmacy does not have order on file. Patient states she went up to pharmacy and they checked the back file and does not have order for medication \"Estrogens Conjugated (Premarin) 0.625 MG/GM vaginal cream\"        Pharmacy:  [facility] [id number] - [place], [place] - [facility] [address] - [phone]  - [phone]      Please advise,  Thank you   "

## 2023-03-14 NOTE — TELEPHONE ENCOUNTER
Patient informed you talked with pharmacy and they do have prescription and also that you verbally gave the pharmacist the prescription.

## 2023-03-15 DIAGNOSIS — Z12.31 ENCOUNTER FOR SCREENING MAMMOGRAM FOR MALIGNANT NEOPLASM OF BREAST: Primary | ICD-10-CM
